# Patient Record
Sex: FEMALE | Race: WHITE | NOT HISPANIC OR LATINO | URBAN - METROPOLITAN AREA
[De-identification: names, ages, dates, MRNs, and addresses within clinical notes are randomized per-mention and may not be internally consistent; named-entity substitution may affect disease eponyms.]

---
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
## 2021-10-13 ENCOUNTER — EMERGENCY (EMERGENCY)
Facility: HOSPITAL | Age: 21
LOS: 0 days | Discharge: HOME | End: 2021-10-14
Attending: EMERGENCY MEDICINE | Admitting: EMERGENCY MEDICINE
Payer: MEDICAID

## 2021-10-13 VITALS
TEMPERATURE: 98 F | SYSTOLIC BLOOD PRESSURE: 113 MMHG | HEART RATE: 76 BPM | OXYGEN SATURATION: 97 % | RESPIRATION RATE: 77 BRPM | DIASTOLIC BLOOD PRESSURE: 57 MMHG

## 2021-10-13 VITALS
DIASTOLIC BLOOD PRESSURE: 63 MMHG | HEART RATE: 65 BPM | SYSTOLIC BLOOD PRESSURE: 100 MMHG | RESPIRATION RATE: 20 BRPM | TEMPERATURE: 100 F | OXYGEN SATURATION: 100 %

## 2021-10-13 DIAGNOSIS — R05.1 ACUTE COUGH: ICD-10-CM

## 2021-10-13 DIAGNOSIS — M54.9 DORSALGIA, UNSPECIFIED: ICD-10-CM

## 2021-10-13 DIAGNOSIS — N39.0 URINARY TRACT INFECTION, SITE NOT SPECIFIED: ICD-10-CM

## 2021-10-13 DIAGNOSIS — R10.30 LOWER ABDOMINAL PAIN, UNSPECIFIED: ICD-10-CM

## 2021-10-13 DIAGNOSIS — J34.89 OTHER SPECIFIED DISORDERS OF NOSE AND NASAL SINUSES: ICD-10-CM

## 2021-10-13 DIAGNOSIS — R11.2 NAUSEA WITH VOMITING, UNSPECIFIED: ICD-10-CM

## 2021-10-13 DIAGNOSIS — K82.8 OTHER SPECIFIED DISEASES OF GALLBLADDER: ICD-10-CM

## 2021-10-13 LAB
ALBUMIN SERPL ELPH-MCNC: 4.5 G/DL — SIGNIFICANT CHANGE UP (ref 3.5–5.2)
ALP SERPL-CCNC: 68 U/L — SIGNIFICANT CHANGE UP (ref 30–115)
ALT FLD-CCNC: 11 U/L — LOW (ref 14–37)
ANION GAP SERPL CALC-SCNC: 16 MMOL/L — HIGH (ref 7–14)
APPEARANCE UR: CLEAR — SIGNIFICANT CHANGE UP
AST SERPL-CCNC: 27 U/L — SIGNIFICANT CHANGE UP (ref 14–37)
BASOPHILS # BLD AUTO: 0.02 K/UL — SIGNIFICANT CHANGE UP (ref 0–0.2)
BASOPHILS NFR BLD AUTO: 0.3 % — SIGNIFICANT CHANGE UP (ref 0–1)
BILIRUB SERPL-MCNC: 0.4 MG/DL — SIGNIFICANT CHANGE UP (ref 0.2–1.2)
BILIRUB UR-MCNC: NEGATIVE — SIGNIFICANT CHANGE UP
BUN SERPL-MCNC: 14 MG/DL — SIGNIFICANT CHANGE UP (ref 10–20)
CALCIUM SERPL-MCNC: 9.6 MG/DL — SIGNIFICANT CHANGE UP (ref 8.5–10.1)
CHLORIDE SERPL-SCNC: 106 MMOL/L — SIGNIFICANT CHANGE UP (ref 98–110)
CO2 SERPL-SCNC: 18 MMOL/L — SIGNIFICANT CHANGE UP (ref 17–32)
COLOR SPEC: SIGNIFICANT CHANGE UP
CREAT SERPL-MCNC: 1.4 MG/DL — SIGNIFICANT CHANGE UP (ref 0.7–1.5)
DIFF PNL FLD: NEGATIVE — SIGNIFICANT CHANGE UP
EOSINOPHIL # BLD AUTO: 0.01 K/UL — SIGNIFICANT CHANGE UP (ref 0–0.7)
EOSINOPHIL NFR BLD AUTO: 0.1 % — SIGNIFICANT CHANGE UP (ref 0–8)
GLUCOSE SERPL-MCNC: 103 MG/DL — HIGH (ref 70–99)
GLUCOSE UR QL: NEGATIVE — SIGNIFICANT CHANGE UP
HCG SERPL QL: NEGATIVE — SIGNIFICANT CHANGE UP
HCT VFR BLD CALC: 38 % — SIGNIFICANT CHANGE UP (ref 37–47)
HGB BLD-MCNC: 12.6 G/DL — SIGNIFICANT CHANGE UP (ref 12–16)
IMM GRANULOCYTES NFR BLD AUTO: 0.4 % — HIGH (ref 0.1–0.3)
KETONES UR-MCNC: SIGNIFICANT CHANGE UP
LEUKOCYTE ESTERASE UR-ACNC: NEGATIVE — SIGNIFICANT CHANGE UP
LIDOCAIN IGE QN: 39 U/L — SIGNIFICANT CHANGE UP (ref 7–60)
LYMPHOCYTES # BLD AUTO: 0.96 K/UL — LOW (ref 1.2–3.4)
LYMPHOCYTES # BLD AUTO: 12.2 % — LOW (ref 20.5–51.1)
MCHC RBC-ENTMCNC: 29.2 PG — SIGNIFICANT CHANGE UP (ref 27–31)
MCHC RBC-ENTMCNC: 33.2 G/DL — SIGNIFICANT CHANGE UP (ref 32–37)
MCV RBC AUTO: 88.2 FL — SIGNIFICANT CHANGE UP (ref 81–99)
MONOCYTES # BLD AUTO: 0.22 K/UL — SIGNIFICANT CHANGE UP (ref 0.1–0.6)
MONOCYTES NFR BLD AUTO: 2.8 % — SIGNIFICANT CHANGE UP (ref 1.7–9.3)
NEUTROPHILS # BLD AUTO: 6.65 K/UL — HIGH (ref 1.4–6.5)
NEUTROPHILS NFR BLD AUTO: 84.2 % — HIGH (ref 42.2–75.2)
NITRITE UR-MCNC: NEGATIVE — SIGNIFICANT CHANGE UP
NRBC # BLD: 0 /100 WBCS — SIGNIFICANT CHANGE UP (ref 0–0)
PH UR: 6.5 — SIGNIFICANT CHANGE UP (ref 5–8)
PLATELET # BLD AUTO: 367 K/UL — SIGNIFICANT CHANGE UP (ref 130–400)
POTASSIUM SERPL-MCNC: 4.8 MMOL/L — SIGNIFICANT CHANGE UP (ref 3.5–5)
POTASSIUM SERPL-SCNC: 4.8 MMOL/L — SIGNIFICANT CHANGE UP (ref 3.5–5)
PROT SERPL-MCNC: 7.7 G/DL — SIGNIFICANT CHANGE UP (ref 6–8)
PROT UR-MCNC: SIGNIFICANT CHANGE UP
RBC # BLD: 4.31 M/UL — SIGNIFICANT CHANGE UP (ref 4.2–5.4)
RBC # FLD: 11.5 % — SIGNIFICANT CHANGE UP (ref 11.5–14.5)
SODIUM SERPL-SCNC: 140 MMOL/L — SIGNIFICANT CHANGE UP (ref 135–146)
SP GR SPEC: 1.01 — SIGNIFICANT CHANGE UP (ref 1.01–1.03)
UROBILINOGEN FLD QL: SIGNIFICANT CHANGE UP
WBC # BLD: 7.89 K/UL — SIGNIFICANT CHANGE UP (ref 4.8–10.8)
WBC # FLD AUTO: 7.89 K/UL — SIGNIFICANT CHANGE UP (ref 4.8–10.8)

## 2021-10-13 PROCEDURE — 76705 ECHO EXAM OF ABDOMEN: CPT | Mod: 26

## 2021-10-13 PROCEDURE — 99285 EMERGENCY DEPT VISIT HI MDM: CPT

## 2021-10-13 PROCEDURE — 76830 TRANSVAGINAL US NON-OB: CPT | Mod: 26

## 2021-10-13 PROCEDURE — 74177 CT ABD & PELVIS W/CONTRAST: CPT | Mod: 26,MA

## 2021-10-13 RX ORDER — METOCLOPRAMIDE HCL 10 MG
10 TABLET ORAL ONCE
Refills: 0 | Status: COMPLETED | OUTPATIENT
Start: 2021-10-13 | End: 2021-10-13

## 2021-10-13 RX ORDER — SODIUM CHLORIDE 9 MG/ML
1000 INJECTION, SOLUTION INTRAVENOUS ONCE
Refills: 0 | Status: COMPLETED | OUTPATIENT
Start: 2021-10-13 | End: 2021-10-13

## 2021-10-13 RX ORDER — SODIUM CHLORIDE 9 MG/ML
1000 INJECTION INTRAMUSCULAR; INTRAVENOUS; SUBCUTANEOUS ONCE
Refills: 0 | Status: COMPLETED | OUTPATIENT
Start: 2021-10-13 | End: 2021-10-13

## 2021-10-13 RX ORDER — IOHEXOL 300 MG/ML
30 INJECTION, SOLUTION INTRAVENOUS ONCE
Refills: 0 | Status: COMPLETED | OUTPATIENT
Start: 2021-10-13 | End: 2021-10-13

## 2021-10-13 RX ORDER — CEFTRIAXONE 500 MG/1
1000 INJECTION, POWDER, FOR SOLUTION INTRAMUSCULAR; INTRAVENOUS ONCE
Refills: 0 | Status: COMPLETED | OUTPATIENT
Start: 2021-10-13 | End: 2021-10-13

## 2021-10-13 RX ORDER — ONDANSETRON 8 MG/1
4 TABLET, FILM COATED ORAL ONCE
Refills: 0 | Status: COMPLETED | OUTPATIENT
Start: 2021-10-13 | End: 2021-10-13

## 2021-10-13 RX ADMIN — SODIUM CHLORIDE 1000 MILLILITER(S): 9 INJECTION INTRAMUSCULAR; INTRAVENOUS; SUBCUTANEOUS at 14:40

## 2021-10-13 RX ADMIN — ONDANSETRON 4 MILLIGRAM(S): 8 TABLET, FILM COATED ORAL at 14:40

## 2021-10-13 RX ADMIN — CEFTRIAXONE 100 MILLIGRAM(S): 500 INJECTION, POWDER, FOR SOLUTION INTRAMUSCULAR; INTRAVENOUS at 17:20

## 2021-10-13 RX ADMIN — Medication 10 MILLIGRAM(S): at 23:10

## 2021-10-13 RX ADMIN — SODIUM CHLORIDE 1000 MILLILITER(S): 9 INJECTION, SOLUTION INTRAVENOUS at 23:10

## 2021-10-13 RX ADMIN — ONDANSETRON 4 MILLIGRAM(S): 8 TABLET, FILM COATED ORAL at 19:44

## 2021-10-13 RX ADMIN — IOHEXOL 30 MILLILITER(S): 300 INJECTION, SOLUTION INTRAVENOUS at 14:40

## 2021-10-13 NOTE — ED PROVIDER NOTE - PATIENT PORTAL LINK FT
You can access the FollowMyHealth Patient Portal offered by [facility] by registering at the following website: [email or web address]. By joining [facility]’s FollowMyHealth portal, you will also be able to view your health information using other applications (apps) compatible with our system.

## 2021-10-13 NOTE — ED PROVIDER NOTE - PHYSICAL EXAMINATION
VITALS: Reviewed  CONSTITUTIONAL: well developed, well nourished, in no acute distress, speaking in full sentences, nontoxic appearing  SKIN: warm, dry, no rash  HEAD: normocephalic, atraumatic  EYES: PERRL, EOMI, no conjunctival erythema, sclera clear  ENT: patent airway, moist mucous membranes  NECK: supple, no masses  CV:  regular rate, regular rhythm, 2+ radial pulses bilaterally  RESP: no wheezes, no rales, no rhonchi, normal work of breathing  ABD: soft, RLQ/Suprapubic tenderness, nondistended, no rebound, no guarding  MSK: normal ROM, no cyanosis, no edema--R CVA tenderness  NEURO: alert, oriented x3  PSYCH: cooperative, appropriate

## 2021-10-13 NOTE — ED PROVIDER NOTE - PROGRESS NOTE DETAILS
PP: Patient pending labs, CT scan, pelvic sonogram. PP: Patient pending labs, CT scan, pelvic sonogram. After urine collected, IV dose of ceftriaxone here in ED. PO challenge prior to discharge. Outpatient ABX as per imaging and plan for discharge. Shine: Endorsed to Dr. Licona, 22 yo F dx with UTI 4 days ago, on cipro, with worsening Right flank pain, and vomiting today. Labs wnl. CT showed pericholecystic fluid. Urine negative. Pending RUQ US for further eval. AN: Sign out received from Dr. Riojas AN: Pt evaluated by me. Presented with complaints of nausea and irgh sided abd pain, Currently on abx for UTI. Required labs, imaging and meds. CT abdpelv with pericholecystic fluid. Pending RUQ US and will reassess. ck: patient with persistent nausea, will give reglan. due to RUQ findings and persistent nausea, will consult surgery, pending callback. Pt cleared by surgical team for DC. Low suspicion for julius and no surgical intervention recommended. On reassessment pt reports improvement in her nausea and is comfortable with DC home. Will send Zofran RX to pharmacy and discharge pt home with return precautions. - AB

## 2021-10-13 NOTE — ED PROVIDER NOTE - CLINICAL SUMMARY MEDICAL DECISION MAKING FREE TEXT BOX
Presented with complaints of nausea, vomiting and right sided abd pain, Currently on abx for UTI. Required labs, imaging and meds. CT abdpelv with pericholecystic fluid.

## 2021-10-13 NOTE — ED PROVIDER NOTE - NS ED ROS FT
Review of Systems:   CONSTITUTIONAL - No fever  SKIN - No rash  HEMATOLOGIC - No abnormal bleeding or bruising  RESPIRATORY - No shortness of breath, No cough  CARDIAC -No chest pain, No palpitations  All other systems negative, unless specified in HPI

## 2021-10-13 NOTE — ED PROVIDER NOTE - NSFOLLOWUPINSTRUCTIONS_ED_ALL_ED_FT

## 2021-10-13 NOTE — ED PROVIDER NOTE - ATTENDING CONTRIBUTION TO CARE
22 yo F with no PMH, (+) for UTI 4 days ago, on cipro x 4 days, with 1 week of symptoms (pt was seen at [facility] [place]). Here with chills, nausea, vomiting, NB/NB emesis x 7 today. LMP 9/4, upreg negative 4 days ago. No vaginal bleeding. Mild suprapubic pain and right sided back pain as she has had before, but got worse today. Mild rhinorrhea and cough x a few days. Exam - Gen - NAD, Head - NCAT,  Pharynx - clear, MMM, Heart - RRR, no m/g/r, Lungs - CTAB, no w/c/r, Abdomen - soft, (+) right CVA tenderness, suprapubic, and RLQ, ND, Skin - No rash, Extremities - FROM, no edema, erythema, ecchymosis, Neuro - CN 2-12 intact, nl strength and sensation, nl gait. Plan - labs, IV, CT, US pelvis, zofran. 20 yo F with no PMH, (+) for UTI 4 days ago, on cipro x 4 days, with 1 week of symptoms (pt was seen at [facility] [place]). Here with chills, nausea, vomiting, NB/NB emesis x 7 today. LMP 9/4, upreg negative 4 days ago. No vaginal bleeding. Mild suprapubic pain and right sided back pain as she has had before, but got worse today. Mild rhinorrhea and cough x a few days. Exam - Gen - NAD, Head - NCAT,  Pharynx - clear, MMM, Heart - RRR, no m/g/r, Lungs - CTAB, no w/c/r, Abdomen - soft, (+) right CVA tenderness, suprapubic, and RLQ, ND, Skin - No rash, Extremities - FROM, no edema, erythema, ecchymosis, Neuro - CN 2-12 intact, nl strength and sensation, nl gait. Plan - labs, IV, CT, US pelvis, zofran, urine. UA negative. US wnl. CT with pericholecystic fluid, but no stones, recommended US for further evaluation. RUQ pending. Labs otherwise wnl.

## 2021-10-13 NOTE — ED PROVIDER NOTE - OBJECTIVE STATEMENT
21Y F with no sig PMH, currently being treated for UTI with ciprofloxacin for 4 days (1 week of symptoms) presents with CC of nausea and vomiting. Patient reports to emesis x7 NBNB today, associated with subjective chills, back pain, and suprapubic pain. Patient denies chest pain, SOB, diarrhea, vaginal bleeding. LMP Sept. 4th, 6 weeks late according to patient, tested for Upreg negative 4 days ago.

## 2021-10-14 RX ORDER — ONDANSETRON 8 MG/1
1 TABLET, FILM COATED ORAL
Qty: 9 | Refills: 0
Start: 2021-10-14 | End: 2021-10-16

## 2021-10-14 NOTE — CONSULT NOTE ADULT - ASSESSMENT
ASSESSMENT:  21F w/ no PMH/PSH presented with 1 day of nausea, vomiting, and RUQ pain. WBC 7.8, LFTs WNL. US showed decompressed GB with slight wall thickening to 6mm. No cholelithiasis seen. CT showed GB w/ pericholecystic fluid. Physical exam findings, imaging, and labs as documented above.     PLAN:  - no acute surgical intervention  - low suspicion for acute cholecystitis  - imaging findings may be over-reading; GB appears normal and WNL    Above plan discussed with Attending Surgeon Dr. Paniagua, patient, and Primary team  10-14-21 @ 01:57

## 2021-10-14 NOTE — CONSULT NOTE ADULT - SUBJECTIVE AND OBJECTIVE BOX
GENERAL SURGERY CONSULT NOTE    Patient: VARSHA CRUZ , 21y ([birth year])Female   MRN: [id number]  Location: [facility] ED  Visit: 10-13-21 Emergency  Date: 10-14-21 @ 01:57    HPI:  21F w/ no PMH/PSH presented with 1 day of nausea, vomiting, and RUQ pain. WBC 7.8, LFTs WNL. US showed decompressed GB with slight wall thickening to 6mm. No cholelithiasis seen. CT showed GB w/ pericholecystic fluid.    PAST MEDICAL & SURGICAL HISTORY:  No pertinent past medical history  No significant past surgical history    VITALS:  T(F): 99.5 (10-13-21 @ 23:27), Max: 99.5 (10-13-21 @ 23:27)  HR: 65 (10-13-21 @ 23:27) (65 - 76)  BP: 100/63 (10-13-21 @ 23:27) (100/63 - 113/57)  RR: 20 (10-13-21 @ 23:27) (20 - 77)  SpO2: 100% (10-13-21 @ 23:27) (97% - 100%)    PHYSICAL EXAM:  General: NAD, AAOx3, calm and cooperative  HEENT: NCAT, LASHA, EOMI, Trachea ML, Neck supple  Cardiac: RRR S1, S2, no Murmurs, rubs or gallops  Respiratory: CTAB, normal respiratory effort, breath sounds equal BL, no wheeze, rhonchi or crackles  Abdomen: Soft, non-distended, non-tender, no rebound, no guarding. +BS.  Skin: Warm/dry, normal color, no jaundice    LAB/STUDIES:                        12.6   7.89  )-----------( 367      ( 13 Oct 2021 14:40 )             38.0     10    140  |  106  |  14  ----------------------------<  103<H>  4.8   |  18  |  1.4    Ca    9.6      13 Oct 2021 14:40    TPro  7.7  /  Alb  4.5  /  TBili  0.4  /  DBili  x   /  AST  27  /  ALT  11<L>  /  AlkPhos  68  1013    LIVER FUNCTIONS - ( 13 Oct 2021 14:40 )  Alb: 4.5 g/dL / Pro: 7.7 g/dL / ALK PHOS: 68 U/L / ALT: 11 U/L / AST: 27 U/L / GGT: x           Urinalysis Basic - ( 13 Oct 2021 14:55 )    Color: Light Yellow / Appearance: Clear / S.011 / pH: x  Gluc: x / Ketone: Trace  / Bili: Negative / Urobili: <2 mg/dL   Blood: x / Protein: Trace / Nitrite: Negative   Leuk Esterase: Negative / RBC: x / WBC x   Sq Epi: x / Non Sq Epi: x / Bacteria: x    IMAGING:  < from: CT Abdomen and Pelvis w/ Oral Cont and w/ IV Cont (10.13.21 @ 17:27) >  IMPRESSION:  Pericholecystic fluid without evidence for cholelithiasis. Focused right upper quadrant ultrasound is recommended for further evaluation.  < end of copied text >    < from: US Transvaginal (10.13.21 @ 18:08) >  IMPRESSION:  Mildly thickened endometrium, which may be related to menstrual phase.  Unremarkable ovaries.  < end of copied text >    < from: US Abdomen Upper Quadrant Right (10.13.21 @ 20:28) >  IMPRESSION:  Nonspecific gallbladder wall thickening without cholelithiasis. Normal caliber CBD.  < end of copied text >    ACCESS DEVICES:  [X] Peripheral IV

## 2021-10-15 LAB
CULTURE RESULTS: SIGNIFICANT CHANGE UP
SPECIMEN SOURCE: SIGNIFICANT CHANGE UP

## 2022-04-28 ENCOUNTER — EMERGENCY (EMERGENCY)
Facility: HOSPITAL | Age: 22
LOS: 0 days | Discharge: HOME | End: 2022-04-28
Attending: EMERGENCY MEDICINE | Admitting: EMERGENCY MEDICINE
Payer: MEDICAID

## 2022-04-28 VITALS
TEMPERATURE: 99 F | OXYGEN SATURATION: 100 % | SYSTOLIC BLOOD PRESSURE: 101 MMHG | RESPIRATION RATE: 18 BRPM | HEART RATE: 96 BPM | DIASTOLIC BLOOD PRESSURE: 76 MMHG

## 2022-04-28 VITALS
HEART RATE: 121 BPM | TEMPERATURE: 96 F | RESPIRATION RATE: 20 BRPM | DIASTOLIC BLOOD PRESSURE: 57 MMHG | SYSTOLIC BLOOD PRESSURE: 107 MMHG | OXYGEN SATURATION: 98 %

## 2022-04-28 DIAGNOSIS — R55 SYNCOPE AND COLLAPSE: ICD-10-CM

## 2022-04-28 DIAGNOSIS — I95.9 HYPOTENSION, UNSPECIFIED: ICD-10-CM

## 2022-04-28 DIAGNOSIS — Z20.822 CONTACT WITH AND (SUSPECTED) EXPOSURE TO COVID-19: ICD-10-CM

## 2022-04-28 DIAGNOSIS — R10.31 RIGHT LOWER QUADRANT PAIN: ICD-10-CM

## 2022-04-28 DIAGNOSIS — N93.9 ABNORMAL UTERINE AND VAGINAL BLEEDING, UNSPECIFIED: ICD-10-CM

## 2022-04-28 DIAGNOSIS — I45.10 UNSPECIFIED RIGHT BUNDLE-BRANCH BLOCK: ICD-10-CM

## 2022-04-28 DIAGNOSIS — R11.0 NAUSEA: ICD-10-CM

## 2022-04-28 DIAGNOSIS — Z87.891 PERSONAL HISTORY OF NICOTINE DEPENDENCE: ICD-10-CM

## 2022-04-28 DIAGNOSIS — R42 DIZZINESS AND GIDDINESS: ICD-10-CM

## 2022-04-28 DIAGNOSIS — R10.32 LEFT LOWER QUADRANT PAIN: ICD-10-CM

## 2022-04-28 LAB
ABO RH CONFIRMATION: SIGNIFICANT CHANGE UP
ALBUMIN SERPL ELPH-MCNC: 4.7 G/DL — SIGNIFICANT CHANGE UP (ref 3.5–5.2)
ALP SERPL-CCNC: 62 U/L — SIGNIFICANT CHANGE UP (ref 30–115)
ALT FLD-CCNC: 8 U/L — LOW (ref 14–37)
ANION GAP SERPL CALC-SCNC: 13 MMOL/L — SIGNIFICANT CHANGE UP (ref 7–14)
APPEARANCE UR: CLEAR — SIGNIFICANT CHANGE UP
APTT BLD: 34 SEC — SIGNIFICANT CHANGE UP (ref 27–39.2)
AST SERPL-CCNC: 13 U/L — LOW (ref 14–37)
BASOPHILS # BLD AUTO: 0.03 K/UL — SIGNIFICANT CHANGE UP (ref 0–0.2)
BASOPHILS NFR BLD AUTO: 0.8 % — SIGNIFICANT CHANGE UP (ref 0–1)
BILIRUB SERPL-MCNC: 0.3 MG/DL — SIGNIFICANT CHANGE UP (ref 0.2–1.2)
BILIRUB UR-MCNC: NEGATIVE — SIGNIFICANT CHANGE UP
BLD GP AB SCN SERPL QL: SIGNIFICANT CHANGE UP
BUN SERPL-MCNC: 5 MG/DL — LOW (ref 10–20)
CALCIUM SERPL-MCNC: 9.4 MG/DL — SIGNIFICANT CHANGE UP (ref 8.5–10.1)
CHLORIDE SERPL-SCNC: 100 MMOL/L — SIGNIFICANT CHANGE UP (ref 98–110)
CO2 SERPL-SCNC: 18 MMOL/L — SIGNIFICANT CHANGE UP (ref 17–32)
COLOR SPEC: YELLOW — SIGNIFICANT CHANGE UP
CREAT SERPL-MCNC: 0.7 MG/DL — SIGNIFICANT CHANGE UP (ref 0.7–1.5)
DIFF PNL FLD: NEGATIVE — SIGNIFICANT CHANGE UP
EGFR: 127 ML/MIN/1.73M2 — SIGNIFICANT CHANGE UP
EOSINOPHIL # BLD AUTO: 0.01 K/UL — SIGNIFICANT CHANGE UP (ref 0–0.7)
EOSINOPHIL NFR BLD AUTO: 0.3 % — SIGNIFICANT CHANGE UP (ref 0–8)
GLUCOSE SERPL-MCNC: 93 MG/DL — SIGNIFICANT CHANGE UP (ref 70–99)
GLUCOSE UR QL: NEGATIVE — SIGNIFICANT CHANGE UP
HCG SERPL QL: NEGATIVE — SIGNIFICANT CHANGE UP
HCG SERPL-ACNC: <0.6 MIU/ML — SIGNIFICANT CHANGE UP
HCT VFR BLD CALC: 37.3 % — SIGNIFICANT CHANGE UP (ref 37–47)
HGB BLD-MCNC: 12.4 G/DL — SIGNIFICANT CHANGE UP (ref 12–16)
IMM GRANULOCYTES NFR BLD AUTO: 0.5 % — HIGH (ref 0.1–0.3)
INR BLD: 1.14 RATIO — SIGNIFICANT CHANGE UP (ref 0.65–1.3)
KETONES UR-MCNC: SIGNIFICANT CHANGE UP
LEUKOCYTE ESTERASE UR-ACNC: NEGATIVE — SIGNIFICANT CHANGE UP
LYMPHOCYTES # BLD AUTO: 0.82 K/UL — LOW (ref 1.2–3.4)
LYMPHOCYTES # BLD AUTO: 21.4 % — SIGNIFICANT CHANGE UP (ref 20.5–51.1)
MCHC RBC-ENTMCNC: 29.5 PG — SIGNIFICANT CHANGE UP (ref 27–31)
MCHC RBC-ENTMCNC: 33.2 G/DL — SIGNIFICANT CHANGE UP (ref 32–37)
MCV RBC AUTO: 88.6 FL — SIGNIFICANT CHANGE UP (ref 81–99)
MONOCYTES # BLD AUTO: 0.56 K/UL — SIGNIFICANT CHANGE UP (ref 0.1–0.6)
MONOCYTES NFR BLD AUTO: 14.6 % — HIGH (ref 1.7–9.3)
NEUTROPHILS # BLD AUTO: 2.39 K/UL — SIGNIFICANT CHANGE UP (ref 1.4–6.5)
NEUTROPHILS NFR BLD AUTO: 62.4 % — SIGNIFICANT CHANGE UP (ref 42.2–75.2)
NITRITE UR-MCNC: NEGATIVE — SIGNIFICANT CHANGE UP
NRBC # BLD: 0 /100 WBCS — SIGNIFICANT CHANGE UP (ref 0–0)
PH UR: 7 — SIGNIFICANT CHANGE UP (ref 5–8)
PLATELET # BLD AUTO: 292 K/UL — SIGNIFICANT CHANGE UP (ref 130–400)
POTASSIUM SERPL-MCNC: 3.6 MMOL/L — SIGNIFICANT CHANGE UP (ref 3.5–5)
POTASSIUM SERPL-SCNC: 3.6 MMOL/L — SIGNIFICANT CHANGE UP (ref 3.5–5)
PROT SERPL-MCNC: 7.4 G/DL — SIGNIFICANT CHANGE UP (ref 6–8)
PROT UR-MCNC: SIGNIFICANT CHANGE UP
PROTHROM AB SERPL-ACNC: 13.1 SEC — HIGH (ref 9.95–12.87)
RBC # BLD: 4.21 M/UL — SIGNIFICANT CHANGE UP (ref 4.2–5.4)
RBC # FLD: 11.5 % — SIGNIFICANT CHANGE UP (ref 11.5–14.5)
SARS-COV-2 RNA SPEC QL NAA+PROBE: SIGNIFICANT CHANGE UP
SODIUM SERPL-SCNC: 131 MMOL/L — LOW (ref 135–146)
SP GR SPEC: 1.02 — SIGNIFICANT CHANGE UP (ref 1.01–1.03)
UROBILINOGEN FLD QL: SIGNIFICANT CHANGE UP
WBC # BLD: 3.83 K/UL — LOW (ref 4.8–10.8)
WBC # FLD AUTO: 3.83 K/UL — LOW (ref 4.8–10.8)

## 2022-04-28 PROCEDURE — 76705 ECHO EXAM OF ABDOMEN: CPT | Mod: 26

## 2022-04-28 PROCEDURE — 99283 EMERGENCY DEPT VISIT LOW MDM: CPT

## 2022-04-28 PROCEDURE — 76830 TRANSVAGINAL US NON-OB: CPT | Mod: 26

## 2022-04-28 PROCEDURE — 93010 ELECTROCARDIOGRAM REPORT: CPT

## 2022-04-28 PROCEDURE — 99285 EMERGENCY DEPT VISIT HI MDM: CPT | Mod: 25

## 2022-04-28 RX ORDER — ACETAMINOPHEN 500 MG
650 TABLET ORAL ONCE
Refills: 0 | Status: COMPLETED | OUTPATIENT
Start: 2022-04-28 | End: 2022-04-28

## 2022-04-28 RX ORDER — ONDANSETRON 8 MG/1
4 TABLET, FILM COATED ORAL ONCE
Refills: 0 | Status: COMPLETED | OUTPATIENT
Start: 2022-04-28 | End: 2022-04-28

## 2022-04-28 RX ORDER — ONDANSETRON 8 MG/1
4 TABLET, FILM COATED ORAL ONCE
Refills: 0 | Status: DISCONTINUED | OUTPATIENT
Start: 2022-04-28 | End: 2022-04-28

## 2022-04-28 RX ORDER — SODIUM CHLORIDE 9 MG/ML
1000 INJECTION, SOLUTION INTRAVENOUS ONCE
Refills: 0 | Status: COMPLETED | OUTPATIENT
Start: 2022-04-28 | End: 2022-04-28

## 2022-04-28 RX ADMIN — SODIUM CHLORIDE 1000 MILLILITER(S): 9 INJECTION, SOLUTION INTRAVENOUS at 10:52

## 2022-04-28 RX ADMIN — ONDANSETRON 4 MILLIGRAM(S): 8 TABLET, FILM COATED ORAL at 11:20

## 2022-04-28 RX ADMIN — Medication 650 MILLIGRAM(S): at 13:10

## 2022-04-28 NOTE — ED PROVIDER NOTE - ATTENDING CONTRIBUTION TO CARE
I personally evaluated the patient. I reviewed the Resident’s or Physician Assistant’s note (as assigned above), and agree with the findings and plan except as documented in my note.  21-year-old female with no significant past medical history G0, P0, LMP 6 weeks ago, complaining of vaginal bleeding since yesterday.  Patient reports vaginal bleeding has been progressively worsening and today is passing clots and large amounts of blood.  Positive associated lower abdominal pain described as cramping.  Patient with dizziness and lightheadedness and near syncopal episode today.  Patient is sexually active.  Unknown pregnancy status.  No vaginal discharge, fever, chills.  Patient was seen at an urgent care center and referred to the ED for further evaluation and treatment.  Vitals noted.

## 2022-04-28 NOTE — ED PROVIDER NOTE - CARE PROVIDER_API CALL
Abad Mcneal)  Obstetrics and Gynecology  [address] [place] [address]  Phone: [phone]  Fax: [phone]  Follow Up Time: 1-3 Days

## 2022-04-28 NOTE — ED ADULT NURSE NOTE - OBJECTIVE STATEMENT
Pt presents to ED with c/o right sided abdominal/pelvic pain and heavy vaginal bleeding. Pt also reports weakness and dizziness. Pt states her LMP was 6 weeks ago. EMS states pt was hypotensive in the field and she responded to 1L IVF.

## 2022-04-28 NOTE — ED PROVIDER NOTE - CLINICAL SUMMARY MEDICAL DECISION MAKING FREE TEXT BOX
21-year-old female no significant past medical history, LMP 6 weeks ago, presented with heavy vaginal bleeding x2 days and hypotension.  Hemoglobin stable.  Hemodynamics improved in the ED with resuscitation.  OB/GYN consulted.  Pelvic sono with no acute abnormalities.  Patient discharged to follow-up with OB/GYN and given strict return instructions.

## 2022-04-28 NOTE — ED PROVIDER NOTE - NSFOLLOWUPINSTRUCTIONS_ED_ALL_ED_FT
Abnormal Uterine Bleeding    Abnormal uterine bleeding can affect women at various stages in life, including teenagers, women in their reproductive years, pregnant women, and women who have reached menopause. Several kinds of uterine bleeding are considered abnormal, including:     Bleeding or spotting between periods.    Bleeding after sexual intercourse.    Bleeding that is heavier or more than normal.    Periods that last longer than usual.  Bleeding after menopause.      Many cases of abnormal uterine bleeding are minor and simple to treat, while others are more serious. Any type of abnormal bleeding should be evaluated by your health care provider. Treatment will depend on the cause of the bleeding.    HOME CARE INSTRUCTIONS  Monitor your condition for any changes. The following actions may help to alleviate any discomfort you are experiencing:    Avoid the use of tampons and douches as directed by your health care provider.   Change your pads frequently.     You should get regular pelvic exams and Pap tests. Keep all follow-up appointments for diagnostic tests as directed by your health care provider.     SEEK MEDICAL CARE IF:  Your bleeding lasts more than 1 week.    You feel dizzy at times.      SEEK IMMEDIATE MEDICAL CARE IF:  You pass out.    You are changing pads every 15 to 30 minutes.    You have abdominal pain.   You have a fever.    You become sweaty or weak.    You are passing large blood clots from the vagina.    You start to feel nauseous and vomit.     MAKE SURE YOU:  Understand these instructions.  Will watch your condition.  Will get help right away if you are not doing well or get worse.    ADDITIONAL NOTES AND INSTRUCTIONS    Please follow up with your Primary MD in 24-48 hr.  Seek immediate medical care for any new/worsening signs or symptoms. Abnormal Uterine Bleeding    Abnormal uterine bleeding can affect women at various stages in life, including teenagers, women in their reproductive years, pregnant women, and women who have reached menopause. Several kinds of uterine bleeding are considered abnormal, including:     Bleeding or spotting between periods.    Bleeding after sexual intercourse.    Bleeding that is heavier or more than normal.    Periods that last longer than usual.  Bleeding after menopause.      Many cases of abnormal uterine bleeding are minor and simple to treat, while others are more serious. Any type of abnormal bleeding should be evaluated by your health care provider. Treatment will depend on the cause of the bleeding.    HOME CARE INSTRUCTIONS  Monitor your condition for any changes. The following actions may help to alleviate any discomfort you are experiencing:    Avoid the use of tampons and douches as directed by your health care provider.   Change your pads frequently.     You should get regular pelvic exams and Pap tests. Keep all follow-up appointments for diagnostic tests as directed by your health care provider.     SEEK MEDICAL CARE IF:  Your bleeding lasts more than 1 week.    You feel dizzy at times.      SEEK IMMEDIATE MEDICAL CARE IF:  You pass out.    You are changing pads every 15 to 30 minutes.    You have abdominal pain.   You have a fever.    You become sweaty or weak.    You are passing large blood clots from the vagina.    You start to feel nauseous and vomit.     MAKE SURE YOU:  Understand these instructions.  Will watch your condition.  Will get help right away if you are not doing well or get worse.    ADDITIONAL NOTES AND INSTRUCTIONS    Please follow up with your Primary MD in 24-48 hr.  Seek immediate medical care for any new/worsening signs or symptoms.    Our Emergency Department Referral Coordinators will be reaching out to you in the next 24-48 hours from 9:00am to 5:00pm with a follow up appointment. Please expect a phone call from the hospital in that time frame. If you do not receive a call or if you have any questions or concerns, you can reach them at [phone] or [phone].

## 2022-04-28 NOTE — ED PROVIDER NOTE - OBJECTIVE STATEMENT
21 y f, no medical problems, lmp 6 wks ago, pw vaginal bleeding, started yesterday, soaking through multiple pads, +near syncopal episode, +abd pain llq rlq, 8/10, no all/agg factors, +nausea but no vomiting/diarrhea, bp 88/30 on ambulance. Pt hd stable in ed, borderline tachy,

## 2022-04-28 NOTE — ED PROVIDER NOTE - PATIENT PORTAL LINK FT
You can access the FollowMyHealth Patient Portal offered by [facility] by registering at the following website: [email or web address]. By joining [facility]’s FollowMyHealth portal, you will also be able to view your health information using other applications (apps) compatible with our system.

## 2022-04-28 NOTE — ED ADULT TRIAGE NOTE - CHIEF COMPLAINT QUOTE
Pt BIBA from [facility] c/o severe right lower abdominal pain since last night w/ heavy vaginal bleeding to R/O ectopic pregnancy. LMP 6 weeks ago. Pt hypotensive @ 88/30 by EMS, improved to 105/57 in Triage w/ 0.9NaCL 1L IV bolus. Pt c/o dizziness and weakness.

## 2022-04-28 NOTE — ED ADULT NURSE NOTE - NSIMPLEMENTINTERV_GEN_ALL_ED
Implemented All Fall with Harm Risk Interventions:  [place] to call system. Call bell, personal items and telephone within reach. Instruct patient to call for assistance. Room bathroom lighting operational. Non-slip footwear when patient is off stretcher. Physically safe environment: no spills, clutter or unnecessary equipment. Stretcher in lowest position, wheels locked, appropriate side rails in place. Provide visual cue, wrist band, yellow gown, etc. Monitor gait and stability. Monitor for mental status changes and reorient to person, place, and time. Review medications for side effects contributing to fall risk. Reinforce activity limits and safety measures with patient and family. Provide visual clues: red socks.

## 2022-04-28 NOTE — ED PROVIDER NOTE - PROGRESS NOTE DETAILS
PT HYPOTENSIVE EN ROUTE WITH EMS. IMPROVED IN ED WITH NO INTERVENTION. FAST NEGATIVE. OB/GYN AT BEDSIDE. PS: Upreg negative. OB Gyn at bedside. Vitals stable. Pending TVUS and CT. BG: Updated gyn abt nml pelvic ultrasound. Will call back after discussion with attending. Likely dc. PS: Pt feeling better, vitals stable. Cleared by GYN. Will discharge with GYN follow up. Return precautions given

## 2022-04-28 NOTE — CONSULT NOTE ADULT - ASSESSMENT
20 yo G0, LMP 4/27/22, with vaginal bleeding, hypotension and tachycardia on admission now resolved, currently clinically and hemodynamically stable     ********INCOMPLETE****************** 20 yo G0, LMP 4/27/22, with vaginal bleeding, hypotension and tachycardia on admission now resolved, currently clinically and hemodynamically stable     -f/u with dr. vegas on 4/2/22 for GYN care, call [phone] for an appointment  -bleeding precautions given  -dispo per ED    Dr. Vegas aware A/P: 22 yo G0, LMP 4/27/22, with abnormal uterine bleeding, currently clinically and hemodynamically stable   -no acute GYN intervention   -f/u with Dr. Montiel outpatient for GYN care. Please call [phone] for an appointment  -bleeding precautions given  -pain management per ED   -dispo per ED    Dr. Montiel aware

## 2022-04-28 NOTE — CONSULT NOTE ADULT - SUBJECTIVE AND OBJECTIVE BOX
Chief Complaint: vaginal bleeding    HPI: 20 yo G0, LMP 22, presented to the ED complaining of vaginal bleeding that started yesterday evening and lightheadedness, dizziness, chills that started this morning. Last night, she reports heavy vaginal bleeding that soaked her clothes and ran down her legs. She has passed several small clots. She has soaked approximately 5 pads since yesterday. Reports intermittent abdominal cramping that started 2 days ago, 8/10 intensity. She has not tried any pain medication for it. Reports nausea that began today, denies vomiting. Is sexually active with one male partner, does not use contraception. She took a urine pregnancy test a few weeks ago which was negative. Denies chest pain, shortness of breath, fevers, diarrhea, constipation. Patient does not have an OBGYN.     Ob/Gyn History:  G0               LMP - 22                 Cycle Length - every 30-40 days, 3-4 days long, moderate flow  Denies history of ovarian cysts, uterine fibroids, abnormal paps, or STIs    Denies the following: constitutional symptoms, visual symptoms, cardiovascular symptoms, respiratory symptoms, GI symptoms, musculoskeletal symptoms, skin symptoms, neurologic symptoms, hematologic symptoms, allergic symptoms, psychiatric symptoms  Except any pertinent positives listed.     Home Medications: None    Allergies: No Known Allergies    PAST MEDICAL & SURGICAL HISTORY: None    FAMILY HISTORY:  Family history of scleroderma (Mother)  in mother    SOCIAL HISTORY: Denies cigarette use, alcohol use, or illicit drug use    Vital Signs Last 24 Hrs  T(F): 96.1 (2022 10:30), Max: 96.1 (2022 10:30)  HR: 88 (2022 11:01) (85 - 121)  BP: 107/58 (2022 11:01) (107/57 - 116/63)  RR: 18 (2022 11:01) (18 - 20)    General Appearance - AAOx3, NAD  Heart - S1S2 regular rate and rhythm  Lung - CTA Bilaterally  Abdomen - Soft, nontender, nondistended, no rebound, no rigidity, no guarding, bowel sounds present    GYN/Pelvis:    Labia Majora - Streaked with dark red blood  Labia Minora - Normal  Clitoris - Normal  Urethra - Normal  Vagina - 20cc of dark red blood in vagina, no lacerations, no lesions  Cervix - Normal, no lacerations, no lesions, no active bleeding. Closed.     Uterus:  Size - Normal  Tenderness - None  Mass - None  Freely mobile    Adnexa:  Masses - None  Tenderness - None      LABS:                        12.4   3.83  )-----------( 292      ( 2022 10:35 )             37.3     Urinalysis Basic - ( 2022 10:10 )    Color: Yellow / Appearance: Clear / S.023 / pH: x  Gluc: x / Ketone: Trace  / Bili: Negative / Urobili: <2 mg/dL   Blood: x / Protein: Trace / Nitrite: Negative   Leuk Esterase: Negative / RBC: x / WBC x   Sq Epi: x / Non Sq Epi: x / Bacteria: x      RADIOLOGY & ADDITIONAL STUDIES:   Chief Complaint: vaginal bleeding    HPI: 22 yo G0, LMP 22, presented to the ED complaining of vaginal bleeding that started yesterday evening and lightheadedness, dizziness, chills that started this morning. Last night, she reports heavy vaginal bleeding that soaked her clothes and ran down her legs. She has passed several small clots. She has soaked approximately 5 pads since yesterday. Reports intermittent abdominal cramping that started 2 days ago, 8/10 intensity. She has not tried any pain medication for it. Reports nausea that began today, denies vomiting. Is sexually active with one male partner, does not use contraception. She took a urine pregnancy test a few weeks ago which was negative. Denies chest pain, shortness of breath, fevers, diarrhea, constipation. Patient does not have an OBGYN.     Ob/Gyn History:  G0               LMP - 22                 Cycle Length - every 30-40 days, 3-4 days long, moderate flow  Denies history of ovarian cysts, uterine fibroids, abnormal paps, or STIs    Denies the following: constitutional symptoms, visual symptoms, cardiovascular symptoms, respiratory symptoms, GI symptoms, musculoskeletal symptoms, skin symptoms, neurologic symptoms, hematologic symptoms, allergic symptoms, psychiatric symptoms  Except any pertinent positives listed.     Home Medications: None    Allergies: No Known Allergies    PAST MEDICAL & SURGICAL HISTORY: None    FAMILY HISTORY:  Family history of scleroderma (Mother)  in mother    SOCIAL HISTORY: Denies cigarette use, alcohol use, or illicit drug use    Vital Signs Last 24 Hrs  T(F): 96.1 (2022 10:30), Max: 96.1 (2022 10:30)  HR: 88 (2022 11:01) (85 - 121)  BP: 107/58 (2022 11:01) (107/57 - 116/63)  RR: 18 (2022 11:01) (18 - 20)    General Appearance - AAOx3, NAD  Heart - S1S2 regular rate and rhythm  Lung - CTA Bilaterally  Abdomen - Soft, nontender, nondistended, no rebound, no rigidity, no guarding, bowel sounds present    GYN/Pelvis:    Labia Majora - Streaked with dark red blood  Labia Minora - Normal  Clitoris - Normal  Urethra - Normal  Vagina - 20cc of dark red blood in vagina, no lacerations, no lesions  Cervix - Normal, no lacerations, no lesions, no active bleeding. Closed.     Uterus:  Size - Normal  Tenderness - None  Mass - None  Freely mobile    Adnexa:  Masses - None  Tenderness - None      LABS:                        12.4   3.83  )-----------( 292      ( 2022 10:35 )             37.3     Urinalysis Basic - ( 2022 10:10 )    Color: Yellow / Appearance: Clear / S.023 / pH: x  Gluc: x / Ketone: Trace  / Bili: Negative / Urobili: <2 mg/dL   Blood: x / Protein: Trace / Nitrite: Negative   Leuk Esterase: Negative / RBC: x / WBC x   Sq Epi: x / Non Sq Epi: x / Bacteria: x      RADIOLOGY & ADDITIONAL STUDIES:  < from: US Transvaginal (22 @ 13:06) >    INTERPRETATION:  CLINICAL INFORMATION: Vaginal bleeding    LMP: 2022    COMPARISON: 10/13/2021.    TECHNIQUE: Transabdominal and transvaginal ultrasound was performed      FINDINGS:  Uterus: 7.9 cm x 3.9 cm x 5.0 cm. Within normal limits.  Endometrium: 5 mm. Within normal limits. No intrauterine gestational sac   seen    Right ovary: 2.9 cm x 2.4 cm x 2.9 cm. Within normal limits. Blood flow  detected  Left ovary: 3.8 cm x 2.3 cm x 2.7 cm. Within normal limits. Blood flow   detected    Fluid: Fluid in the cul-de-sac.    IMPRESSION:  Normal pelvic sonogram.          < end of copied text >

## 2022-04-28 NOTE — ED ADULT NURSE NOTE - CHIEF COMPLAINT QUOTE
Pt BIBA from [facility] [place] c/o severe right lower abdominal pain since last night w/ heavy vaginal bleeding to R/O ectopic pregnancy. LMP 6 weeks ago. Pt hypotensive @ 88/30 by EMS, improved to 105/57 in Triage w/ 0.9NaCL 1L IV bolus. Pt c/o dizziness and weakness.

## 2022-04-28 NOTE — ED PROVIDER NOTE - PHYSICAL EXAMINATION
CONSTITUTIONAL: NAD  SKIN: Warm dry  HEAD: NCAT  EYES: NL inspection  ENT: MMM  NECK: Supple; non tender.  CARD: RRR  RESP: CTAB  ABD: ttp llq, rlq, +20cc blood in vaginal canal.   EXT: no pedal edema  NEURO: Grossly unremarkable  PSYCH: Cooperative, appropriate. CONSTITUTIONAL: NAD  SKIN: Warm dry  HEAD: NCAT  EYES: NL inspection  ENT: MMM  NECK: Supple; non tender.  CARD: RRR  RESP: CTAB  ABD: ttp llq, rlq  PELVIC: pt unable to tolerate full pelvic exam, +20cc blood in vaginal canal, no CMT, no sores, ulcers or lesions  EXT: no pedal edema  NEURO: Grossly unremarkable  PSYCH: Cooperative, appropriate.

## 2022-05-02 PROBLEM — Z00.00 ENCOUNTER FOR PREVENTIVE HEALTH EXAMINATION: Status: ACTIVE | Noted: 2022-05-02

## 2022-05-03 ENCOUNTER — APPOINTMENT (OUTPATIENT)
Dept: OBGYN | Facility: CLINIC | Age: 22
End: 2022-05-03

## 2023-02-19 NOTE — ED PROVIDER NOTE - NSPTACCESSSVCSAPPT_ED_ALL_ED
Refill Decision Note   Aysha Moore  is requesting a refill authorization.  Brief Assessment and Rationale for Refill:  Approve     Medication Therapy Plan:       Medication Reconciliation Completed: No   Comments:     No Care Gaps recommended.     Note composed:5:11 PM 02/19/2023           Specialty Care (Routine)...

## 2023-03-27 NOTE — ED PROVIDER NOTE - NSFOLLOWUPCLINICSTOKEN_GEN_ALL_ED_FT
495050:1-3 Days; Fluconazole Pregnancy And Lactation Text: This medication is Pregnancy Category C and it isn't know if it is safe during pregnancy. It is also excreted in breast milk.

## 2023-06-21 NOTE — ED ADULT NURSE NOTE - NSFALLRSKINDICATORS_ED_ALL_ED
AT Initial Evaluation    Name: Ifeanyi Mc  Age: 22 y o  Sport: Outdoor track and field   Date of Assessment: 6/21/2023    Assessment/Plan:   Visit Diagnosis: Left knee pain    Treatment Plan:   []  Follow-up PRN  []  Follow-up prior to next practice/game for re-evaluation  [x]  Daily treatment/rehab  Progress note expected weekly  Referral:   [x]  Not needed at this time  []  Referred to:     Subjective: Pt comes in complaining of left knee pain  Pt has PMHx of left knee patella tendonitis and medial glide  Pt feels her pain during walking, sitting to standing, and stairs  She has done PT for this knee in the past  Pt requested stim on her knee today due to pain and that has helped it in the past      Objective:   Medial tracking patella    - 10 squats pain at 4-5/10   - Jacob tape applied    - 10 squats pain at 0/10    Treatment Log:  Date:  4/25/23   Playing Status: As tolerated       Exercise/Treatment    Pain relief TENS 20min       6/21  Discharged  LB ATC    4/26  Pt was upset about how her practice went and the expectations of her  not matching with her expectations  Completed elevation and full body mobilizations to assist in her recovery    LB ATC
no

## 2023-08-09 NOTE — ED PEDIATRIC NURSE NOTE - BREATH SOUNDS, MLM
Quality 431: Preventive Care And Screening: Unhealthy Alcohol Use - Screening: Patient not identified as an unhealthy alcohol user when screened for unhealthy alcohol use using a systematic screening method
Quality 130: Documentation Of Current Medications In The Medical Record: Current Medications Documented
Detail Level: Detailed
Quality 226: Preventive Care And Screening: Tobacco Use: Screening And Cessation Intervention: Patient screened for tobacco use and is an ex/non-smoker
Clear